# Patient Record
Sex: FEMALE | Race: OTHER | NOT HISPANIC OR LATINO | URBAN - METROPOLITAN AREA
[De-identification: names, ages, dates, MRNs, and addresses within clinical notes are randomized per-mention and may not be internally consistent; named-entity substitution may affect disease eponyms.]

---

## 2020-06-16 PROBLEM — Z00.00 ENCOUNTER FOR PREVENTIVE HEALTH EXAMINATION: Status: ACTIVE | Noted: 2020-06-16

## 2020-06-21 ENCOUNTER — OUTPATIENT (OUTPATIENT)
Dept: OUTPATIENT SERVICES | Facility: HOSPITAL | Age: 65
LOS: 1 days | End: 2020-06-21
Payer: SELF-PAY

## 2020-06-21 ENCOUNTER — APPOINTMENT (OUTPATIENT)
Dept: MRI IMAGING | Facility: IMAGING CENTER | Age: 65
End: 2020-06-21
Payer: SELF-PAY

## 2020-06-21 DIAGNOSIS — M25.511 PAIN IN RIGHT SHOULDER: ICD-10-CM

## 2020-06-21 DIAGNOSIS — M47.22 OTHER SPONDYLOSIS WITH RADICULOPATHY, CERVICAL REGION: ICD-10-CM

## 2020-06-21 DIAGNOSIS — M47.12 OTHER SPONDYLOSIS WITH MYELOPATHY, CERVICAL REGION: ICD-10-CM

## 2020-06-21 PROCEDURE — 73221 MRI JOINT UPR EXTREM W/O DYE: CPT

## 2020-06-21 PROCEDURE — 73221 MRI JOINT UPR EXTREM W/O DYE: CPT | Mod: 26,RT

## 2020-06-21 PROCEDURE — 72141 MRI NECK SPINE W/O DYE: CPT | Mod: 26

## 2020-06-21 PROCEDURE — 72141 MRI NECK SPINE W/O DYE: CPT

## 2022-07-08 PROBLEM — Z00.00 ENCOUNTER FOR PREVENTIVE HEALTH EXAMINATION: Status: ACTIVE | Noted: 2022-07-08

## 2022-07-11 ENCOUNTER — APPOINTMENT (OUTPATIENT)
Dept: ORTHOPEDIC SURGERY | Facility: CLINIC | Age: 67
End: 2022-07-11

## 2022-07-11 VITALS
BODY MASS INDEX: 23.9 KG/M2 | HEIGHT: 64 IN | WEIGHT: 140 LBS | DIASTOLIC BLOOD PRESSURE: 78 MMHG | SYSTOLIC BLOOD PRESSURE: 118 MMHG

## 2022-07-11 DIAGNOSIS — M65.332 TRIGGER FINGER, LEFT MIDDLE FINGER: ICD-10-CM

## 2022-07-11 DIAGNOSIS — M65.311 TRIGGER THUMB, RIGHT THUMB: ICD-10-CM

## 2022-07-11 PROCEDURE — 20550 NJX 1 TENDON SHEATH/LIGAMENT: CPT

## 2022-07-11 PROCEDURE — 99213 OFFICE O/P EST LOW 20 MIN: CPT | Mod: 25

## 2024-09-18 ENCOUNTER — APPOINTMENT (OUTPATIENT)
Dept: VASCULAR SURGERY | Facility: CLINIC | Age: 69
End: 2024-09-18
Payer: MEDICARE

## 2024-09-18 VITALS
HEIGHT: 64 IN | HEART RATE: 90 BPM | SYSTOLIC BLOOD PRESSURE: 141 MMHG | WEIGHT: 137 LBS | DIASTOLIC BLOOD PRESSURE: 86 MMHG | TEMPERATURE: 97.7 F | BODY MASS INDEX: 23.39 KG/M2

## 2024-09-18 DIAGNOSIS — Z78.9 OTHER SPECIFIED HEALTH STATUS: ICD-10-CM

## 2024-09-18 DIAGNOSIS — I83.893 VARICOSE VEINS OF BILATERAL LOWER EXTREMITIES WITH OTHER COMPLICATIONS: ICD-10-CM

## 2024-09-18 DIAGNOSIS — I87.2 VENOUS INSUFFICIENCY (CHRONIC) (PERIPHERAL): ICD-10-CM

## 2024-09-18 PROCEDURE — 99204 OFFICE O/P NEW MOD 45 MIN: CPT

## 2024-09-18 PROCEDURE — 93970 EXTREMITY STUDY: CPT

## 2024-09-25 PROBLEM — Z78.9 SOCIAL ALCOHOL USE: Status: ACTIVE | Noted: 2024-09-18

## 2024-09-25 PROBLEM — Z78.9 NON-SMOKER: Status: ACTIVE | Noted: 2024-09-18

## 2024-09-25 PROBLEM — I87.2 CHRONIC VENOUS INSUFFICIENCY: Status: ACTIVE | Noted: 2024-09-25

## 2024-09-25 PROBLEM — I83.893 SYMPTOMATIC VARICOSE VEINS OF BOTH LOWER EXTREMITIES: Status: ACTIVE | Noted: 2024-09-25

## 2024-09-25 RX ORDER — DAPAGLIFLOZIN 10 MG/1
10 TABLET, FILM COATED ORAL
Refills: 0 | Status: ACTIVE | COMMUNITY

## 2024-09-25 RX ORDER — MELOXICAM 15 MG/1
TABLET ORAL
Refills: 0 | Status: ACTIVE | COMMUNITY

## 2024-09-25 RX ORDER — LEVOTHYROXINE SODIUM 0.17 MG/1
TABLET ORAL
Refills: 0 | Status: ACTIVE | COMMUNITY

## 2024-09-25 RX ORDER — METFORMIN HYDROCHLORIDE 625 MG/1
TABLET ORAL
Refills: 0 | Status: ACTIVE | COMMUNITY

## 2024-09-25 RX ORDER — TELMISARTAN 80 MG/1
80 TABLET ORAL
Refills: 0 | Status: ACTIVE | COMMUNITY

## 2024-09-25 NOTE — END OF VISIT
[facility]  Benoit: Kyle Lang [Time Spent: ___ minutes] : I have spent [unfilled] minutes of time on the encounter which excludes teaching and separately reported services.

## 2024-09-25 NOTE — HISTORY OF PRESENT ILLNESS
[FreeTextEntry1] : Ms. BRANDON GEORGE is a 69 year F who presents for initial evaluation of bilateral leg pain for the past several months.  Ms. GEORGE leg discomfort is associated with bilateral leg swelling, fatigue, heaviness, achiness, restlessness, muscle cramping, itchiness, dry, flaky skin, and skin darkening at the ankles.  She complains of painful varicose veins. Her symptoms have persisted despite conservative management with leg elevation, exercise, attempted weight loss, over-the-counter medications (ibuprofen), and compression stocking use for more than 3 months.  Her symptoms are aggravated by weight bearing, prolonged standing, prolonged sitting, extended travel, exercise and pregnancy. Nothing helps to alleviate patient's symptoms.  Her symptoms interfere with the her ADLs such as work, shopping and housekeeping.  Ms. GEORGE risks factor for venous disease are pregnancy, family history of venous disease, history of varicose veins, spider veins. Previous treatment, vein procedures and vein surgeries include: wearing compression stockings for more than 3 months with little or no relief

## 2024-09-25 NOTE — ASSESSMENT
[FreeTextEntry1] : Ms. BRANDON GEORGE is a 69 year with persistent and worsening bilateral lower extremity venous insufficiency, CEAP classification C4a which is unresponsive to at least 3 months of compression stockings 20-30 mmHg, leg elevation, exercise and over the counter pain medication_(Ibuprofen). Patient has complaints of worsening bilateral leg discomfort with swelling, fatigue, heaviness, achiness, cramping, restlessness, and painful varicosities. The patients symptoms interfere with their ADLs, such as walking, shopping and house work, and their ability to work and exercise. Patient has intact pulses in both legs without evidence of arterial insufficiency.  Treatment is indicated not for cosmetic reasons but for symptomatic venous reflux disease with symptoms which is refractory to conservative therapy. Venous duplex study demonstrates bilateral lower extremity venous insufficiency. The need for definitive effective treatment is based on severe, interfering and worsening reflux symptoms with evidence of venous insufficiency on venous ultrasound.  Patient is a candidate for endovenous ablation treatment of the bilateral GSV.  The risks and benefits of endovenous ablation treatment versus continued conservative management were discussed with the patient. Patient chooses endovenous ablation treatments. Treatment plan to be scheduled.

## 2024-09-25 NOTE — PHYSICAL EXAM
[FreeTextEntry1] : LE vein findings:  Varicosities (spider, reticular, varicose) bilateral  Edema bilateral  Skin changes dry, flaky skin, stasis dermatitis, hyperpigmentation in the gator area Ulcer none CEAP classification C4a Palpable DP pulses bilaterally

## 2024-11-27 RX ORDER — ALPRAZOLAM 0.25 MG/1
0.25 TABLET ORAL
Qty: 1 | Refills: 0 | Status: ACTIVE | COMMUNITY
Start: 2024-11-27 | End: 1900-01-01

## 2024-11-27 RX ORDER — LIDOCAINE HYDROCHLORIDE 10 MG/ML
1 INJECTION, SOLUTION INFILTRATION; PERINEURAL
Qty: 50 | Refills: 0 | Status: ACTIVE | COMMUNITY
Start: 2024-11-27 | End: 1900-01-01

## 2024-12-02 ENCOUNTER — NON-APPOINTMENT (OUTPATIENT)
Age: 69
End: 2024-12-02

## 2024-12-05 ENCOUNTER — APPOINTMENT (OUTPATIENT)
Dept: VASCULAR SURGERY | Facility: CLINIC | Age: 69
End: 2024-12-05

## 2024-12-12 ENCOUNTER — APPOINTMENT (OUTPATIENT)
Dept: VASCULAR SURGERY | Facility: CLINIC | Age: 69
End: 2024-12-12

## 2024-12-18 ENCOUNTER — APPOINTMENT (OUTPATIENT)
Dept: VASCULAR SURGERY | Facility: CLINIC | Age: 69
End: 2024-12-18

## 2025-01-08 ENCOUNTER — APPOINTMENT (OUTPATIENT)
Dept: VASCULAR SURGERY | Facility: CLINIC | Age: 70
End: 2025-01-08